# Patient Record
Sex: FEMALE | Race: WHITE | NOT HISPANIC OR LATINO | Employment: OTHER | URBAN - METROPOLITAN AREA
[De-identification: names, ages, dates, MRNs, and addresses within clinical notes are randomized per-mention and may not be internally consistent; named-entity substitution may affect disease eponyms.]

---

## 2020-12-30 ENCOUNTER — TELEPHONE (OUTPATIENT)
Dept: GASTROENTEROLOGY | Facility: CLINIC | Age: 65
End: 2020-12-30

## 2021-03-24 ENCOUNTER — TELEPHONE (OUTPATIENT)
Dept: GASTROENTEROLOGY | Facility: CLINIC | Age: 66
End: 2021-03-24

## 2021-03-25 VITALS — HEIGHT: 64 IN | BODY MASS INDEX: 36.7 KG/M2 | WEIGHT: 215 LBS

## 2021-03-25 RX ORDER — LISINOPRIL 5 MG/1
5 TABLET ORAL DAILY
COMMUNITY

## 2021-03-25 RX ORDER — MELATONIN
1000 DAILY
COMMUNITY

## 2021-03-25 RX ORDER — MULTIVITAMIN WITH IRON
100 TABLET ORAL DAILY
COMMUNITY

## 2021-03-25 RX ORDER — PHENOL 1.4 %
600 AEROSOL, SPRAY (ML) MUCOUS MEMBRANE 2 TIMES DAILY WITH MEALS
COMMUNITY

## 2021-03-25 NOTE — TELEPHONE ENCOUNTER
Why does your doctor want you to have this procedure? Hx of polyps, fam  Hx of colon colon     Do you have kidney disease?  no  If yes, are you on dialysis :     Have you had diverticulitis within the past 2 months? no    Are you diabetic?  no  If yes, insulin dependent: If yes, provide diabetic instructions sheet     Do take iron supplements?  no  If yes, instruct patient to hold iron supplement for 7 days prior    Are you on a blood thinner? no   Was the blood thinner sheet complete and faxed to cardiologist no  Plavix (clopidogrel), Coumadin (warfarin), Lovenox (enoxaparin), Xarelto (rivaroxaban), Pradaxa(dabigatran), Eliquis(apixaban) Savaysa/Lixiana (edoxapan)    Do you have an automatic implantable cardiac defibrillator (AICD)/pacemaker ([facility])? no  Was AICD/pacemaker sheet completed and faxed to cardiologist? no    Are you on home oxygen? no  If yes, continuous or nocturnal:     Have you been treated for MRSA, VRE or any communicable diseases? no    Heart attack, stroke, or stent within 3 months? no  Schedule at Hospital if within 3-6 months   Use nitroglycerin for chest pain in the last 6 months? no    History of organ  transplant?  no   If yes, notify Endo      History of neck/throat/tongue surgery or cancer? no  IF yes, notify Endo      Any problems with anesthesia in the past? no     Was stool C diff ordered?  no Stool specimen needs to be completed prior to procedure    Do have any facial or body piercings?no     Do you have a latex allergy? no     Do have an allergy to metals? (Bravo study only) no     If pediatric patient, was consent faxed to pediatrician no     Patient rights reviewed yes    Miralax and gatorade prep given and emailed to pt

## 2021-03-30 ENCOUNTER — ANESTHESIA (OUTPATIENT)
Dept: GASTROENTEROLOGY | Facility: AMBULATORY SURGERY CENTER | Age: 66
End: 2021-03-30

## 2021-03-30 ENCOUNTER — HOSPITAL ENCOUNTER (OUTPATIENT)
Dept: GASTROENTEROLOGY | Facility: AMBULATORY SURGERY CENTER | Age: 66
Discharge: HOME | End: 2021-03-30
Payer: MEDICARE

## 2021-03-30 ENCOUNTER — ANESTHESIA EVENT (OUTPATIENT)
Dept: GASTROENTEROLOGY | Facility: AMBULATORY SURGERY CENTER | Age: 66
End: 2021-03-30

## 2021-03-30 VITALS
HEART RATE: 78 BPM | DIASTOLIC BLOOD PRESSURE: 65 MMHG | HEIGHT: 64 IN | WEIGHT: 214 LBS | SYSTOLIC BLOOD PRESSURE: 122 MMHG | OXYGEN SATURATION: 99 % | BODY MASS INDEX: 36.54 KG/M2 | RESPIRATION RATE: 20 BRPM | TEMPERATURE: 98 F

## 2021-03-30 DIAGNOSIS — Z80.0 FAMILY HISTORY OF COLON CANCER: ICD-10-CM

## 2021-03-30 DIAGNOSIS — Z86.010 HISTORY OF COLON POLYPS: ICD-10-CM

## 2021-03-30 PROCEDURE — 88305 TISSUE EXAM BY PATHOLOGIST: CPT | Performed by: PATHOLOGY

## 2021-03-30 PROCEDURE — 45380 COLONOSCOPY AND BIOPSY: CPT | Performed by: INTERNAL MEDICINE

## 2021-03-30 RX ORDER — SODIUM CHLORIDE 9 MG/ML
50 INJECTION, SOLUTION INTRAVENOUS CONTINUOUS
Status: DISCONTINUED | OUTPATIENT
Start: 2021-03-30 | End: 2021-04-03 | Stop reason: HOSPADM

## 2021-03-30 RX ORDER — PROPOFOL 10 MG/ML
INJECTION, EMULSION INTRAVENOUS AS NEEDED
Status: DISCONTINUED | OUTPATIENT
Start: 2021-03-30 | End: 2021-03-30

## 2021-03-30 RX ADMIN — PROPOFOL 50 MG: 10 INJECTION, EMULSION INTRAVENOUS at 10:44

## 2021-03-30 RX ADMIN — PROPOFOL 50 MG: 10 INJECTION, EMULSION INTRAVENOUS at 10:40

## 2021-03-30 RX ADMIN — PROPOFOL 100 MG: 10 INJECTION, EMULSION INTRAVENOUS at 10:37

## 2021-03-30 RX ADMIN — PROPOFOL 50 MG: 10 INJECTION, EMULSION INTRAVENOUS at 10:48

## 2021-03-30 RX ADMIN — SODIUM CHLORIDE 50 ML/HR: 9 INJECTION, SOLUTION INTRAVENOUS at 09:58

## 2021-03-30 NOTE — ANESTHESIA PREPROCEDURE EVALUATION
Procedure:  COLONOSCOPY    Relevant Problems   No relevant active problems        Physical Exam    Airway    Mallampati score: II  TM Distance: >3 FB  Neck ROM: full     Dental   No notable dental hx     Cardiovascular  Rhythm: regular, Rate: normal, Cardiovascular exam normal    Pulmonary  Pulmonary exam normal Breath sounds clear to auscultation,     Other Findings        Anesthesia Plan  ASA Score- 3     Anesthesia Type- IV sedation with anesthesia with ASA Monitors  Additional Monitors:   Airway Plan:     Comment: Benefits and risks of planned anesthetic discussed with patient, and agrees to proceed  I, Dr Satish Rust, the attending anesthesiologist, have personally seen and evaluated the patient prior to anesthetic care  I have reviewed the preanesthetic record, and other medical records if appropriate to the anesthetic care  If a CRNA is involved in the case, I have reviewed the CRNA assessment, if present, and agree  The patient is in a suitable condition to proceed with my formulated anesthetic plan          Plan Factors-    Chart reviewed  Patient is not a current smoker  Induction- intravenous  Postoperative Plan-     Informed Consent- Anesthetic plan and risks discussed with patient

## 2021-03-30 NOTE — DISCHARGE INSTRUCTIONS
Hemorrhoids   WHAT YOU NEED TO KNOW:   What are hemorrhoids? Hemorrhoids are swollen blood vessels inside your rectum (internal hemorrhoids) or on your anus (external hemorrhoids)  Sometimes a hemorrhoid may prolapse  This means it extends out of your anus  What increases my risk for hemorrhoids? · Pregnancy or obesity    · Straining or sitting for a long time during bowel movements    · Liver disease    · Weak muscles around the anus caused by older age, rectal surgery, or anal intercourse    · A lack of physical activity    · Chronic diarrhea or constipation    · A low-fiber diet    What are the signs and symptoms of hemorrhoids? · Pain or itching around your anus or inside your rectum    · Swelling or bumps around your anus    · Bright red blood in your bowel movement, on the toilet paper, or in the toilet bowl    · Tissue bulging out of your anus (prolapsed hemorrhoids)    · Incontinence (poor control over urine or bowel movements)    How are hemorrhoids diagnosed? Your healthcare provider will ask about your symptoms, the foods you eat, and your bowel movements  He or she will examine your anus for external hemorrhoids  You may need the following:  · A digital rectal exam  is a test to check for hemorrhoids  Your healthcare provider will put a gloved finger inside your anus to feel for the hemorrhoids  · An anoscopy  is a test that uses a scope (small tube with a light and camera on the end) to look at your hemorrhoids  How are hemorrhoids treated? Treatment will depend on your symptoms  You may need any of the following:  · Medicines  can help decrease pain and swelling, and soften your bowel movement  The medicine may be a pill, pad, cream, or ointment  · Procedures  may be used to shrink or remove your hemorrhoid  Examples include rubber-band ligation, sclerotherapy, and photocoagulation  These procedures may be done in your healthcare provider's office   Ask your healthcare provider for more information about these procedures  · Surgery  may be needed to shrink or remove your hemorrhoids  How can I manage my symptoms? · Apply ice on your anus for 15 to 20 minutes every hour or as directed  Use an ice pack, or put crushed ice in a plastic bag  Cover it with a towel before you apply it to your anus  Ice helps prevent tissue damage and decreases swelling and pain  · Take a sitz bath  Fill a bathtub with 4 to 6 inches of warm water  You may also use a sitz bath pan that fits inside a toilet bowl  Sit in the sitz bath for 15 minutes  Do this 3 times a day, and after each bowel movement  The warm water can help decrease pain and swelling  · Keep your anal area clean  Gently wash the area with warm water daily  Soap may irritate the area  After a bowel movement, wipe with moist towelettes or wet toilet paper  Dry toilet paper can irritate the area  How can I help prevent hemorrhoids? · Do not strain to have a bowel movement  Do not sit on the toilet too long  These actions can increase pressure on the tissues in your rectum and anus  · Drink plenty of liquids  Liquids can help prevent constipation  Ask how much liquid to drink each day and which liquids are best for you  · Eat a variety of high-fiber foods  Examples include fruits, vegetables, and whole grains  Ask your healthcare provider how much fiber you need each day  You may need to take a fiber supplement  · Exercise as directed  Exercise, such as walking, may make it easier to have a bowel movement  Ask your healthcare provider to help you create an exercise plan  · Do not have anal sex  Anal sex can weaken the skin around your rectum and anus  · Avoid heavy lifting  This can cause straining and increase your risk for another hemorrhoid  When should I seek immediate care? · You have severe pain in your rectum or around your anus      · You have severe pain in your abdomen and you are vomiting  · You have bleeding from your anus that soaks through your underwear  When should I contact my healthcare provider? · You have frequent and painful bowel movements  · Your hemorrhoid looks or feels more swollen than usual      · You do not have a bowel movement for 2 days or more  · You see or feel tissue coming through your anus  · You have questions or concerns about your condition or care  CARE AGREEMENT:   You have the right to help plan your care  Learn about your health condition and how it may be treated  Discuss treatment options with your healthcare providers to decide what care you want to receive  You always have the right to refuse treatment  The above information is an  only  It is not intended as medical advice for individual conditions or treatments  Talk to your doctor, nurse or pharmacist before following any medical regimen to see if it is safe and effective for you  © Copyright [address] Information is for End User's use only and may not be sold, redistributed or otherwise used for commercial purposes  All illustrations and images included in CareNotes® are the copyrighted property of A D A M , Inc  or [address] [place] [address]  Colorectal Polyps   WHAT YOU NEED TO KNOW:   What are colorectal polyps? Colorectal polyps are small growths of tissue in the lining of the colon and rectum  Most polyps are hyperplastic polyps and are usually benign (noncancerous)  Certain types of polyps, called adenomatous polyps, may turn into cancer  What increases my risk of colorectal polyps? The exact cause of colorectal polyps is unknown   The following may increase your risk:  · Older age    · A diet of foods high in fat and low in fiber     · Family history of polyps    · Intestinal diseases, such as Crohn's disease or ulcerative colitis    · An unhealthy lifestyle, such as physical inactivity, smoking, or drinking alcohol    · Obesity    What are the signs and symptoms of colorectal polyps? · Blood in your bowel movement or bleeding from the rectum    · Change in bowel movement habits, such as diarrhea and constipation    · Abdominal pain    How are colorectal polyps diagnosed? You should have fecal blood screening once a year for colorectal disease if you are over 48years old  You should be screened earlier if you have an intestinal disease or a family history of polyps or colorectal cancer  During this screening, a sample of your bowel movement is checked for blood, which may be an early sign of colorectal polyps or cancer  You may also need any of the following tests:  · Digital rectal exam:  Your healthcare provider will examine your anus and use a finger to check your rectum for polyps  · Barium enema: A barium enema is an x-ray of the colon  A tube is put into your anus, and a liquid called barium is put through the tube  Barium is used so that healthcare providers can see your colon better on the x-ray film  · Virtual colonoscopy: This is a CT scan that takes pictures of the inside of your colon and rectum  A small, flexible tube is put into your rectum and air or carbon dioxide (gas) is used to expand your colon  This lets healthcare providers clearly see your colon and any polyps on a monitor  · Colonoscopy or sigmoidoscopy: These procedures help your healthcare provider see the inside of your colon using a flexible tube with a small light and camera on the end  During a sigmoidoscopy, your healthcare provider will only look at rectum and lower colon  During a colonoscopy, healthcare providers will look at the full length of your colon  Healthcare providers may remove a small amount of tissue from the colon for a biopsy  How are colorectal polyps treated? A polypectomy is a minimally invasive procedure to remove your polyps  They may be removed during a colonoscopy or sigmoidoscopy   Your healthcare provider may need to remove the polyps with a laparoscope  Laparoscopy is done by inserting a small, flexible scope into incisions made on your abdomen  What are the risks of colorectal polyps? You may bleed during a colonoscopy procedure  Your bowel may be perforated (torn) when polyps are removed  This may lead to an open abdominal surgery  During surgery, you may bleed too much or get an infection  Adenomatous polyps that are not removed may turn into cancer and become more difficult to treat  Where can I find support and more information? · [facility] ([facility])  [address], [place] [address]  Phone: [phone]  Web Address: Siddhartha Falcon  [facility] gov    When should I contact my healthcare provider? · You have a fever  · You have chills, a cough, or feel weak and achy  · You have abdominal pain that does not go away or gets worse after you take medicine  · Your abdomen is swollen  · You are losing weight without trying  · You have questions or concerns about your condition or care  When should I seek immediate care or call 911? · You have sudden shortness of breath  · You have a fast heart rate, fast breathing, or are too dizzy to stand up  · You have severe abdominal pain  · You see blood in your bowel movement  CARE AGREEMENT:   You have the right to help plan your care  Learn about your health condition and how it may be treated  Discuss treatment options with your healthcare providers to decide what care you want to receive  You always have the right to refuse treatment  The above information is an  only  It is not intended as medical advice for individual conditions or treatments  Talk to your doctor, nurse or pharmacist before following any medical regimen to see if it is safe and effective for you    © Copyright [address] [place] [address] Drive Information is for End User's use only and may not be sold, redistributed or otherwise used for commercial purposes   All illustrations and images included in CareNotes® are the copyrighted property of A D A M , Inc  or [facility] Christa Dwyer

## 2021-03-30 NOTE — H&P
History and Physical - [facility]  Cece Smith 77 y o  female MRN: [id number]    HPI: Cece Smith is a 77y o  year old female who presents for surveillance colonoscopy secondary to personal history of colon polyps and mother with colon cancer    REVIEW OF SYSTEMS: Per the HPI, and otherwise unremarkable  Historical Information   Past Medical History:   Diagnosis Date    Blood clot in vein     right jugular    Cancer ([address] )     endometrial    Cholelithiasis     Colon polyp     Hypertension     Neuropathy      Past Surgical History:   Procedure Laterality Date     SECTION      COLONOSCOPY      ENDOMETRIAL BIOPSY      HYSTERECTOMY      LUNG SURGERY  2019    right lobe nodules    PORTACATH PLACEMENT       Social History   Social History     Substance and Sexual Activity   Alcohol Use Not Currently     Social History     Substance and Sexual Activity   Drug Use Not Currently     Social History     Tobacco Use   Smoking Status Never Smoker   Smokeless Tobacco Never Used     Family History   Problem Relation Age of Onset    Cancer Sister         skin ca    Colon cancer Neg Hx     Colon polyps Neg Hx        Meds/Allergies       Current Outpatient Medications:     calcium carbonate (Calcium 600) 600 MG tablet    cholecalciferol (VITAMIN D3) 1,000 units tablet    lisinopril (ZESTRIL) 5 mg tablet    pyridoxine (VITAMIN B6) 100 mg tablet    Current Facility-Administered Medications:     sodium chloride 0 9 % infusion, 50 mL/hr, Intravenous, Continuous, Continue from Pre-op at 21 1028    Allergies   Allergen Reactions    Colace [Docusate] Anaphylaxis     Liquid only, tablet's can be used   Penicillins Anaphylaxis    Dilaudid [Hydromorphone] Vomiting    Eliquis [Apixaban] Other (See Comments)     Several side effects          Objective     /87   Pulse 103   Temp 98 °F (36 7 °C) (Temporal)   Resp 13   Ht 5' 3 5" (1 613 m)   Wt 97 1 kg (214 lb)   SpO2 99%   BMI 37 31 kg/m²     PHYSICAL EXAM    Gen: NAD AAOx3  CV: S1S2 RRR no m/r/g  CHEST: Clear b/l no c/r/w  ABD: soft, +BS NT/ND  EXT: no edema    ASSESSMENT/PLAN:  This is a 77y o  year old female here for surveillance colonoscopy secondary to personal history of colon polyps and mother with colon cancer, and she is stable and optimized for her procedure

## 2021-04-06 ENCOUNTER — TELEPHONE (OUTPATIENT)
Dept: GASTROENTEROLOGY | Facility: CLINIC | Age: 66
End: 2021-04-06

## 2022-07-25 ENCOUNTER — ANNUAL EXAM (OUTPATIENT)
Dept: OBGYN CLINIC | Facility: CLINIC | Age: 67
End: 2022-07-25

## 2022-07-25 VITALS
WEIGHT: 213.2 LBS | SYSTOLIC BLOOD PRESSURE: 118 MMHG | HEIGHT: 63 IN | BODY MASS INDEX: 37.78 KG/M2 | DIASTOLIC BLOOD PRESSURE: 78 MMHG

## 2022-07-25 DIAGNOSIS — Z85.42 HISTORY OF ENDOMETRIAL CANCER: ICD-10-CM

## 2022-07-25 DIAGNOSIS — Z01.419 ENCNTR FOR GYN EXAM (GENERAL) (ROUTINE) W/O ABN FINDINGS: Primary | ICD-10-CM

## 2022-07-25 DIAGNOSIS — Z12.31 ENCOUNTER FOR SCREENING MAMMOGRAM FOR MALIGNANT NEOPLASM OF BREAST: ICD-10-CM

## 2022-07-25 NOTE — PROGRESS NOTES
Medicare [address] [address], [address], [place], [address] [facility] [place]    ASSESSMENT/PLAN: Darren Soto is a 69 y o  No obstetric history on file  who presents for [employer] gynecologic wellness exam     Encounter for routine gynecologic examination  - Routine well woman exam completed today  - Cervical Cancer Screening complete, no further screening necessary   - Breast Cancer Screening: Last Mammogram Not on file, up to date, last mammogram 11/2021  - Colorectal cancer screening last 3/2021, next due 5 years from 2021   - Osteoporosis screening up to date  - The following were reviewed in today's visit: breast self exam and osteoporosis  Discussed with patient Medicare (and plans that act like Medicare) pay for wellness visit q 2 yrs - but patient may return for problems as needed  Recommend annual breast exam and mammogram   If not seen by our office on her off year, she can still call and ask for a screening mammogram order and the nurses will provide one for her  Additional problems addressed during this visit:  1  Encntr for gyn exam (general) (routine) w/o abn findings    2  Encounter for screening mammogram for malignant neoplasm of breast  -     Mammo screening bilateral w 3d & cad; Future    3  History of endometrial cancer      -  Patient will continue to follow up with Oncologists at [facility]    Next visit: 2 yrs/PRN      CC:  Medicare Gynecologic Wellness Examination    HPI: Darren Soto is a 69 y o  No obstetric history on file  who presents for [employer] gynecologic examination  Patient presents for Gyn exam   Personal h/o Endometrial cancer diagnosed 3 years ago  Patient underwent hysterectomy, and has been closely followed by Oncologists at [facility]  Has been using vaginal dilators since second round of radiation      Gyn History       She  reports previously being sexually active         Past Medical History:  No date: Blood clot in vein      Comment: right jugular  No date: Cancer [facility])      Comment:  endometrial  No date: Cholelithiasis  No date: Colon polyp  No date: Hypertension  No date: Neuropathy     Past Surgical History:  No date:  SECTION  No date: COLONOSCOPY  No date: ENDOMETRIAL BIOPSY  No date: HYSTERECTOMY  2019: LUNG SURGERY      Comment:  right lobe nodules  No date: PORTACATH PLACEMENT  2021: URETHRECTOMY     Family History   Problem Relation Age of Onset    Cancer Sister         skin ca    Colon cancer Neg Hx     Colon polyps Neg Hx         Social History     Tobacco Use    Smoking status: Never Smoker    Smokeless tobacco: Never Used   Vaping Use    Vaping Use: Never used   Substance Use Topics    Alcohol use: Not Currently    Drug use: Not Currently          Current Outpatient Medications:     calcium carbonate (OS-OMAR) 600 MG tablet, Take 600 mg by mouth 2 (two) times a day with meals, Disp: , Rfl:     cholecalciferol (VITAMIN D3) 1,000 units tablet, Take 1,000 Units by mouth daily, Disp: , Rfl:     lisinopril (ZESTRIL) 5 mg tablet, Take 5 mg by mouth daily, Disp: , Rfl:     pyridoxine (VITAMIN B6) 100 mg tablet, Take 100 mg by mouth daily, Disp: , Rfl:     She is allergic to colace [docusate], penicillins, dilaudid [hydromorphone], and eliquis [apixaban]       ROS negative except as noted in HPI    Objective:  /78 (BP Location: Left arm, Patient Position: Sitting, Cuff Size: Standard)   Ht 5' 2 75" (1 594 m)   Wt 96 7 kg (213 lb 3 2 oz)   BMI 38 07 kg/m²      Physical Exam  Vitals and nursing note reviewed  HENT:      Head: Normocephalic  Chest:   Breasts: Breasts are symmetrical       Right: Normal  No bleeding, mass, nipple discharge, skin change, tenderness or axillary adenopathy  Left: Normal  No bleeding, mass, nipple discharge, skin change, tenderness or axillary adenopathy  Abdominal:      General: There is no distension  Palpations: Abdomen is soft  There is no mass  Tenderness: There is no abdominal tenderness  There is no rebound  Genitourinary:     General: Normal vulva  Exam position: Lithotomy position  Labia:         Right: No rash, tenderness or lesion  Left: No rash, tenderness or lesion  Urethra: No urethral pain or urethral lesion  Vagina: Normal  No vaginal discharge  Adnexa: Right adnexa normal and left adnexa normal       Rectum: No external hemorrhoid  Comments: Cervix/uterus surgically absent  Musculoskeletal:      Right lower leg: No edema  Left lower leg: No edema  Lymphadenopathy:      Upper Body:      Right upper body: No axillary or pectoral adenopathy  Left upper body: No axillary or pectoral adenopathy  Skin:     General: Skin is warm  Neurological:      Mental Status: She is alert and oriented to person, place, and time  Psychiatric:         Mood and Affect: Mood normal          Behavior: Behavior normal          Thought Content:  Thought content normal

## 2023-11-21 ENCOUNTER — TELEPHONE (OUTPATIENT)
Age: 68
End: 2023-11-21

## 2023-11-21 NOTE — TELEPHONE ENCOUNTER
Pt called to schedule an appt with Rob Rowekatrina but she needs colon and rectal. Not willing to drive that far. She will call back.

## 2024-07-31 ENCOUNTER — ANNUAL EXAM (OUTPATIENT)
Dept: OBGYN CLINIC | Facility: CLINIC | Age: 69
End: 2024-07-31
Payer: MEDICARE

## 2024-07-31 VITALS
BODY MASS INDEX: 37.7 KG/M2 | HEIGHT: 63 IN | SYSTOLIC BLOOD PRESSURE: 116 MMHG | WEIGHT: 212.8 LBS | DIASTOLIC BLOOD PRESSURE: 80 MMHG

## 2024-07-31 DIAGNOSIS — Z85.42 HISTORY OF ENDOMETRIAL CANCER: ICD-10-CM

## 2024-07-31 DIAGNOSIS — Z01.419 ENCNTR FOR GYN EXAM (GENERAL) (ROUTINE) W/O ABN FINDINGS: Primary | ICD-10-CM

## 2024-07-31 DIAGNOSIS — Z12.31 ENCOUNTER FOR SCREENING MAMMOGRAM FOR BREAST CANCER: ICD-10-CM

## 2024-07-31 PROCEDURE — G0101 CA SCREEN;PELVIC/BREAST EXAM: HCPCS | Performed by: OBSTETRICS & GYNECOLOGY

## 2024-07-31 RX ORDER — ATORVASTATIN CALCIUM 10 MG/1
10 TABLET, FILM COATED ORAL DAILY
COMMUNITY
Start: 2024-03-04

## 2024-08-12 NOTE — PROGRESS NOTES
Medicare Gyn Wellness  [facility] [place]  [address], [address], [place] [address]    ASSESSMENT/PLAN: Lavern Bello is a 69 y.o.  who presents for Medicare gynecologic wellness exam.    Encounter for routine gynecologic examination  - Routine well woman exam completed today.  - Cervical Cancer Screening complete, no further screening necessary.  - Breast Cancer Screening: Last Mammogram 2023  - Colorectal cancer screening last up to date.  - The following were reviewed in today's visit: breast self exam, mammography screening ordered, osteoporosis, exercise, healthy diet, DEXA scan, and colonoscopy discussed.    Discussed with patient Medicare (and plans that act like Medicare) pay for wellness visit q 2 yrs - but patient may return for problems as needed.  Recommend annual breast exam and mammogram.  If not seen by our office on her off year, she can still call and ask for a screening mammogram order and the nurses will provide one for her.    Additional problems addressed during this visit:  1. Encntr for gyn exam (general) (routine) w/o abn findings  2. Encounter for screening mammogram for breast cancer  3. History of endometrial cancer     -  Following up with [facility]    Next visit: 2 yrs/PRN      CC:  Medicare Gynecologic Wellness Examination    HPI: Lavern Bello is a 69 y.o.  who presents for Medicare gynecologic examination.  Patient presents for Gyn wellness.  Declines physical exam stating she is following up with Gyn/Onc and has had physical exam and imaging with them    Gyn History     She  reports that she is not currently sexually active.       Past Medical History:  No date: Blood clot in vein      Comment:  right jugular  No date: Cancer (HCC)      Comment:  endometrial  No date: Cholelithiasis  No date: Colon polyp  No date: Hypertension  No date: Neuropathy     Past Surgical History:  No date:  SECTION  No date: COLONOSCOPY  No date: ENDOMETRIAL  "BIOPSY  No date: HYSTERECTOMY  2019: LUNG SURGERY      Comment:  right lobe nodules  No date: PORTACATH PLACEMENT  05/13/2021: URETHRECTOMY     Family History   Problem Relation Age of Onset    Colon cancer Mother     Hypertension Mother     Osteoporosis Mother     Cancer Father     Cancer Sister         skin ca    Osteoarthritis Sister     Osteoarthritis Brother     Hypertension Son     Colon polyps Neg Hx         Social History     Tobacco Use    Smoking status: Never    Smokeless tobacco: Never   Vaping Use    Vaping status: Never Used   Substance Use Topics    Alcohol use: Not Currently    Drug use: Never          Current Outpatient Medications:     atorvastatin (Lipitor) 10 mg tablet, Take 10 mg by mouth daily, Disp: , Rfl:     calcium carbonate (OS-OMAR) 600 MG tablet, Take 600 mg by mouth 2 (two) times a day with meals, Disp: , Rfl:     cholecalciferol (VITAMIN D3) 1,000 units tablet, Take 1,000 Units by mouth daily, Disp: , Rfl:     lisinopril (ZESTRIL) 5 mg tablet, Take 5 mg by mouth daily, Disp: , Rfl:     pyridoxine (VITAMIN B6) 100 mg tablet, Take 100 mg by mouth daily, Disp: , Rfl:     She is allergic to colace [docusate], penicillins, dilaudid [hydromorphone], eliquis [apixaban], peanut oil - food allergy, and short ragweed pollen ext..    ROS negative except as noted in HPI    Objective:  /80 (BP Location: Left arm, Patient Position: Sitting, Cuff Size: Standard)   Ht 5' 2.75\" (1.594 m)   Wt 96.5 kg (212 lb 12.8 oz)   BMI 38.00 kg/m²      Physical Exam    "

## 2025-07-31 ENCOUNTER — HOSPITAL ENCOUNTER (OUTPATIENT)
Age: 70
Discharge: HOME | End: 2025-07-31
Attending: OBSTETRICS & GYNECOLOGY
Payer: MEDICARE

## 2025-07-31 VITALS
HEART RATE: 78 BPM | WEIGHT: 213 LBS | SYSTOLIC BLOOD PRESSURE: 119 MMHG | HEIGHT: 63 IN | DIASTOLIC BLOOD PRESSURE: 62 MMHG | BODY MASS INDEX: 37.74 KG/M2

## 2025-07-31 DIAGNOSIS — C54.1 MALIGNANT NEOPLASM OF ENDOMETRIUM (HCC): ICD-10-CM

## 2025-07-31 LAB
AORTIC ROOT: 3.4 CM
AORTIC VALVE MEAN VELOCITY: 10.9 M/S
ASCENDING AORTA: 2.9 CM
AV AREA BY CONTINUOUS VTI: 2.6 CM2
AV AREA PEAK VELOCITY: 2.9 CM2
AV LVOT MEAN GRADIENT: 4 MMHG
AV LVOT PEAK GRADIENT: 8 MMHG
AV MEAN PRESS GRAD SYS DOP V1V2: 5 MMHG
AV ORIFICE AREA US: 2.56 CM2
AV PEAK GRADIENT: 9 MMHG
AV VELOCITY RATIO: 0.81
AV VMAX SYS DOP: 1.51 M/S
BSA FOR ECHO PROCEDURE: 1.98 M2
DOP CALC AO VTI: 36.7 CM
DOP CALC LVOT AREA: 3.14 CM2
DOP CALC LVOT DIAMETER: 2 CM
DOP CALC LVOT PEAK VEL VTI: 29.88 CM
DOP CALC LVOT PEAK VEL: 1.37 M/S
DOP CALC LVOT STROKE INDEX: 47 ML/M2
DOP CALC LVOT STROKE VOLUME: 93.82
DOP CALC RVOT PEAK VEL: 0.69 M/S
DOP CALC RVOT VTI: 16.48 CM
E WAVE DECELERATION TIME: 179 MS
E/A RATIO: 0.9
FRACTIONAL SHORTENING: 47 (ref 28–44)
INTERVENTRICULAR SEPTUM IN DIASTOLE (PARASTERNAL SHORT AXIS VIEW): 1 CM
INTERVENTRICULAR SEPTUM: 1 CM (ref 0.6–1.1)
LAAS-AP2: 22.8 CM2
LAAS-AP4: 20.1 CM2
LEFT ATRIUM VOLUME (MOD BIPLANE): 74 ML
LEFT ATRIUM VOLUME INDEX (MOD BIPLANE): 37.4 ML/M2
LEFT INTERNAL DIMENSION IN SYSTOLE: 2.6 CM (ref 2.1–4)
LEFT VENTRICULAR INTERNAL DIMENSION IN DIASTOLE: 4.9 CM (ref 3.5–6)
LEFT VENTRICULAR POSTERIOR WALL IN END DIASTOLE: 1 CM
LEFT VENTRICULAR STROKE VOLUME: 88 ML
LVSV (TEICH): 88 ML
MV E'TISSUE VEL-LAT: 11 CM/S
MV E'TISSUE VEL-SEP: 6 CM/S
MV PEAK A VEL: 1.02 M/S
MV PEAK E VEL: 92 CM/S
MV STENOSIS PRESSURE HALF TIME: 52 MS
MV VALVE AREA P 1/2 METHOD: 4.23
PV MEAN GRADIENT: 2 MMHG
PV MEAN VELOCITY: 63 CM/S
PV PEAK GRADIENT: 3 MMHG
PV PEAK VELOCITY: 84 CM/S
PV VTI: 20.34 CM
RIGHT ATRIAL 2D VOLUME: 20 ML
RIGHT ATRIUM AREA SYSTOLE A4C: 10.3 CM2
SINOTUBULAR JUNCTION: 2.7 CM
SL CV LEFT ATRIUM LENGTH A2C: 5.6 CM
SL CV LV EF: 55
SL CV PED ECHO LEFT VENTRICLE DIASTOLIC VOLUME (MOD BIPLANE) 2D: 112 ML
SL CV PED ECHO LEFT VENTRICLE SYSTOLIC VOLUME (MOD BIPLANE) 2D: 24 ML
SL CV RVOT MAX GRADIENT: 2 MMHG
SL CV RVOT MEAN GRADIENT: 1 MMHG
SL CV RVOT VMEAN: 0.47 M/S
STJ: 2.7 CM
TR MAX PG: 22 MMHG
TR PEAK VELOCITY: 2.3 M/S
TRICUSPID ANNULAR PLANE SYSTOLIC EXCURSION: 2.1 CM
TRICUSPID VALVE PEAK REGURGITATION VELOCITY: 2.34 M/S

## 2025-07-31 PROCEDURE — 93306 TTE W/DOPPLER COMPLETE: CPT

## 2025-08-11 ENCOUNTER — TELEPHONE (OUTPATIENT)
Age: 70
End: 2025-08-11

## 2025-08-19 PROBLEM — H26.9 CATARACT: Status: ACTIVE | Noted: 2025-08-19

## 2025-08-19 PROBLEM — D35.00 ADRENAL ADENOMA: Status: ACTIVE | Noted: 2025-08-19

## 2025-08-19 PROBLEM — H93.19 TINNITUS: Status: ACTIVE | Noted: 2025-08-19

## 2025-08-19 PROBLEM — I89.0 LYMPHEDEMA: Status: ACTIVE | Noted: 2025-08-19

## 2025-08-19 PROBLEM — C54.1 ENDOMETRIAL CANCER, FIGO STAGE IIIA (HCC): Status: ACTIVE | Noted: 2025-08-19

## 2025-08-19 PROBLEM — M51.26 RLD (RUPTURED LUMBAR DISC): Status: ACTIVE | Noted: 2025-08-19

## 2025-08-19 PROBLEM — Z85.828 HX OF BASAL CELL CARCINOMA: Status: ACTIVE | Noted: 2025-08-19

## 2025-08-19 PROBLEM — E66.01 MORBID OBESITY DUE TO EXCESS CALORIES (HCC): Status: ACTIVE | Noted: 2025-08-19

## 2025-08-19 PROBLEM — K64.9 HEMORRHOIDS: Status: ACTIVE | Noted: 2025-08-19

## 2025-08-19 PROBLEM — M85.80 OSTEOPENIA: Status: ACTIVE | Noted: 2025-08-19

## 2025-08-19 PROBLEM — K62.9 RECTAL LESION: Status: ACTIVE | Noted: 2025-08-19

## 2025-08-19 PROBLEM — I10 HTN (HYPERTENSION): Status: ACTIVE | Noted: 2025-08-19

## 2025-08-26 PROBLEM — I10 HYPERTENSION: Status: RESOLVED | Noted: 2017-06-28 | Resolved: 2025-08-26

## 2025-08-26 PROBLEM — E66.01 MORBID OBESITY WITH BMI OF 40.0-44.9, ADULT (HCC): Status: ACTIVE | Noted: 2025-08-26

## 2025-08-26 PROBLEM — E66.01 MORBID OBESITY WITH BMI OF 40.0-44.9, ADULT (HCC): Status: RESOLVED | Noted: 2025-08-26 | Resolved: 2025-08-26

## 2025-08-26 PROBLEM — I10 HYPERTENSION: Status: ACTIVE | Noted: 2017-06-28
